# Patient Record
Sex: FEMALE | Race: WHITE | Employment: FULL TIME | URBAN - METROPOLITAN AREA
[De-identification: names, ages, dates, MRNs, and addresses within clinical notes are randomized per-mention and may not be internally consistent; named-entity substitution may affect disease eponyms.]

---

## 2017-10-27 ENCOUNTER — OFFICE VISIT (OUTPATIENT)
Dept: FAMILY MEDICINE CLINIC | Facility: CLINIC | Age: 42
End: 2017-10-27

## 2017-10-27 VITALS
HEIGHT: 63 IN | RESPIRATION RATE: 18 BRPM | TEMPERATURE: 98 F | SYSTOLIC BLOOD PRESSURE: 102 MMHG | WEIGHT: 122.13 LBS | HEART RATE: 61 BPM | BODY MASS INDEX: 21.64 KG/M2 | DIASTOLIC BLOOD PRESSURE: 64 MMHG

## 2017-10-27 DIAGNOSIS — Z12.39 ENCOUNTER FOR OTHER SCREENING FOR MALIGNANT NEOPLASM OF BREAST: ICD-10-CM

## 2017-10-27 DIAGNOSIS — Z00.00 ROUTINE GENERAL MEDICAL EXAMINATION AT A HEALTH CARE FACILITY: Primary | ICD-10-CM

## 2017-10-27 PROCEDURE — 99396 PREV VISIT EST AGE 40-64: CPT | Performed by: FAMILY MEDICINE

## 2017-10-27 RX ORDER — TIMOLOL MALEATE 5 MG/ML
0.5 SOLUTION/ DROPS OPHTHALMIC 2 TIMES DAILY
COMMUNITY
Start: 2017-09-15

## 2017-10-27 RX ORDER — DORZOLAMIDE HCL 20 MG/ML
0.5 SOLUTION/ DROPS OPHTHALMIC 2 TIMES DAILY
COMMUNITY
Start: 2017-09-15

## 2017-10-27 NOTE — PROGRESS NOTES
Loi Christianson is a 38year old female here for Patient presents with: Well Adult: Cpx only.     HPI:   Patient is see for her annual physical   Pap done last year was normal  Does do breast exam at home    PAST MEDICAL HISTORY:     Past Medical History Activity: Yes    Partners: Male     Other Topics Concern    Caffeine Concern No    Exercise Yes     Social History Narrative     REVIEW OF SYSTEMS:     Constitutional: no change in weight or appetite. No fever, fatigue or myalgias.   Eye: No change in visio atraumatic, normocephalic,external ears and TMs normal, nose patent, throat normal without inflammation. NECK: supple,no adenopathy, no thyromegaly. CHEST: no chest wall tenderness.   BREASTS: symmetrical bilateral, no dominant or suspicious mass, no nipp

## 2017-11-10 ENCOUNTER — HOSPITAL ENCOUNTER (OUTPATIENT)
Dept: MAMMOGRAPHY | Age: 42
Discharge: HOME | End: 2017-11-10
Attending: FAMILY MEDICINE
Payer: COMMERCIAL

## 2017-11-10 DIAGNOSIS — Z12.39 ENCOUNTER FOR OTHER SCREENING FOR MALIGNANT NEOPLASM OF BREAST: ICD-10-CM

## 2017-11-10 PROCEDURE — 77067 SCR MAMMO BI INCL CAD: CPT | Performed by: FAMILY MEDICINE

## 2017-11-22 ENCOUNTER — HOSPITAL ENCOUNTER (OUTPATIENT)
Dept: MAMMOGRAPHY | Age: 42
Discharge: HOME | End: 2017-11-22
Attending: FAMILY MEDICINE
Payer: COMMERCIAL

## 2017-11-22 ENCOUNTER — TELEPHONE (OUTPATIENT)
Dept: FAMILY MEDICINE CLINIC | Facility: CLINIC | Age: 42
End: 2017-11-22

## 2017-11-22 DIAGNOSIS — R92.2 INCONCLUSIVE MAMMOGRAM: ICD-10-CM

## 2017-11-22 PROCEDURE — 77065 DX MAMMO INCL CAD UNI: CPT | Performed by: FAMILY MEDICINE

## 2017-11-22 NOTE — TELEPHONE ENCOUNTER
Dr Av Matthews wants to do / stereotactic  breast biopsy for 1 site due to calcium deposits. Patient and spouse aware.

## 2017-11-29 ENCOUNTER — PATIENT MESSAGE (OUTPATIENT)
Dept: FAMILY MEDICINE CLINIC | Facility: CLINIC | Age: 42
End: 2017-11-29

## 2017-11-29 ENCOUNTER — HOSPITAL ENCOUNTER (OUTPATIENT)
Dept: MAMMOGRAPHY | Facility: HOSPITAL | Age: 42
Discharge: HOME | End: 2017-11-29
Attending: FAMILY MEDICINE
Payer: COMMERCIAL

## 2017-11-29 DIAGNOSIS — R92.1 BREAST CALCIFICATIONS: ICD-10-CM

## 2017-11-29 PROCEDURE — 88305 TISSUE EXAM BY PATHOLOGIST: CPT | Performed by: FAMILY MEDICINE

## 2017-11-29 PROCEDURE — 19081 BX BREAST 1ST LESION STRTCTC: CPT | Performed by: FAMILY MEDICINE

## 2017-11-29 NOTE — TELEPHONE ENCOUNTER
From: Milton Greco  To: Mariaa Corbett MD  Sent: 11/29/2017 3:51 PM CST  Subject: Other    Hello,    I haven't had my blood work done yet because I was getting the mammogram work done. I had to have a biopsy done today.     Would it be okay if I tr

## 2017-11-29 NOTE — PROCEDURES
PROCEDURE: [facility] BIOPSY STEREOTACTIC W/CALC 1 SITE LEFT    COMPARISON: [place], [facility] DIAGNOSTIC ADDL VWS LEFT (GVF=[id number]), 11/22/2017, 7:52.     INDICATIONS: R92.1 Mammographic calcification found on diagnostic imaging of breast    DESCRIPTION: Witnessed ve

## 2017-11-30 ENCOUNTER — TELEPHONE (OUTPATIENT)
Dept: MAMMOGRAPHY | Facility: HOSPITAL | Age: 42
End: 2017-11-30

## 2017-11-30 NOTE — TELEPHONE ENCOUNTER
From: Zulema Boothe  To: Vashti Fowler MD  Sent: 11/29/2017 9:21 PM CST  Subject: Other    Hello, can you please send me a list or link of the facilities I can have labs drawn?   Thanks, Joanna

## 2017-11-30 NOTE — TELEPHONE ENCOUNTER
Telephoned [facility] and name,  verified with pt. Notified Joanna Conway of benign left breast stereotactic biopsy result. Joanna Wells Bodily reports biopsy site is healing well. Hematoma management discussed.  Radiologist recommends next [facility]

## 2017-11-30 NOTE — TELEPHONE ENCOUNTER
You can log on to   [email or web address]    Follow the prompts for lab locations    Please be mikey you are using an [employer] for the [facility] discount (the [facility] labs by [facility] are not [facility] labs)

## 2018-09-26 ENCOUNTER — PATIENT MESSAGE (OUTPATIENT)
Dept: FAMILY MEDICINE CLINIC | Facility: CLINIC | Age: 43
End: 2018-09-26

## 2018-09-26 DIAGNOSIS — Z12.31 ENCOUNTER FOR SCREENING MAMMOGRAM FOR MALIGNANT NEOPLASM OF BREAST: ICD-10-CM

## 2018-09-26 DIAGNOSIS — R92.0 BREAST MICROCALCIFICATIONS: Primary | ICD-10-CM

## 2018-09-27 NOTE — TELEPHONE ENCOUNTER
From: Savana Holland  To: nAdre Hathaway MD  Sent: 9/26/2018 10:59 AM CDT  Subject: Non-Urgent Medical Question    Hello,  I'm inquiring to see if I was supposed to have a follow-up mammogram at 6 months or a year.  I've passed the 6months but want t

## 2018-09-27 NOTE — TELEPHONE ENCOUNTER
11/17  Radiologist recommends next mammogram is due in 6 months for follow up. Pt verbalized understanding and had no further questions at this time.        Mammogram pended.

## 2018-10-17 ENCOUNTER — HOSPITAL ENCOUNTER (OUTPATIENT)
Dept: MAMMOGRAPHY | Age: 43
Discharge: HOME | End: 2018-10-17
Attending: FAMILY MEDICINE
Payer: COMMERCIAL

## 2018-10-17 DIAGNOSIS — R92.0 BREAST MICROCALCIFICATIONS: ICD-10-CM

## 2018-10-17 PROCEDURE — 77062 BREAST TOMOSYNTHESIS BI: CPT | Performed by: FAMILY MEDICINE

## 2018-10-17 PROCEDURE — 77066 DX MAMMO INCL CAD BI: CPT | Performed by: FAMILY MEDICINE

## 2018-11-16 ENCOUNTER — OFFICE VISIT (OUTPATIENT)
Dept: FAMILY MEDICINE CLINIC | Facility: CLINIC | Age: 43
End: 2018-11-16
Payer: COMMERCIAL

## 2018-11-16 VITALS
BODY MASS INDEX: 21.82 KG/M2 | HEIGHT: 63 IN | TEMPERATURE: 98 F | HEART RATE: 64 BPM | OXYGEN SATURATION: 99 % | SYSTOLIC BLOOD PRESSURE: 112 MMHG | RESPIRATION RATE: 16 BRPM | DIASTOLIC BLOOD PRESSURE: 68 MMHG | WEIGHT: 123.13 LBS

## 2018-11-16 DIAGNOSIS — Z23 NEED FOR VACCINATION: ICD-10-CM

## 2018-11-16 DIAGNOSIS — Z00.00 ROUTINE GENERAL MEDICAL EXAMINATION AT A HEALTH CARE FACILITY: Primary | ICD-10-CM

## 2018-11-16 DIAGNOSIS — Z12.83 SCREENING FOR SKIN CANCER: ICD-10-CM

## 2018-11-16 PROCEDURE — 90471 IMMUNIZATION ADMIN: CPT | Performed by: FAMILY MEDICINE

## 2018-11-16 PROCEDURE — 99396 PREV VISIT EST AGE 40-64: CPT | Performed by: FAMILY MEDICINE

## 2018-11-16 PROCEDURE — 90472 IMMUNIZATION ADMIN EACH ADD: CPT | Performed by: FAMILY MEDICINE

## 2018-11-16 PROCEDURE — 90714 TD VACC NO PRESV 7 YRS+ IM: CPT | Performed by: FAMILY MEDICINE

## 2018-11-16 PROCEDURE — 90686 IIV4 VACC NO PRSV 0.5 ML IM: CPT | Performed by: FAMILY MEDICINE

## 2018-11-16 NOTE — PROGRESS NOTES
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 GENERAL: well developed, well nourished,in no apparent distress  SKIN: multiple benign appearing nevi on trunk arms and legs.  Small less than 1 mm scab like lesion in th

## 2018-11-16 NOTE — PATIENT INSTRUCTIONS
Prevention Guidelines, Women Ages 36 to 52  Screening tests and vaccines are an important part of managing your health. A screening test is done to find possible disorders or diseases in people who don't have any symptoms.  The goal is to find a disease e Depression All women in this age group At routine exams   Gonorrhea Sexually active women at increased risk for infection At routine exams   Hepatitis C Anyone at increased risk; 1 time for those born between [facility] At routine exams   High cholester Meningococcal Women at increased risk for infection–talk with your healthcare provider 1 or more doses   Pneumococcal conjugate vaccine (PCV13) and pneumococcal polysaccharide vaccine (PPSV23) Women at increased risk for infection–talk with your healthcare

## 2018-11-21 ENCOUNTER — LAB ENCOUNTER (OUTPATIENT)
Dept: LAB | Age: 43
End: 2018-11-21
Attending: FAMILY MEDICINE
Payer: COMMERCIAL

## 2018-11-21 DIAGNOSIS — Z00.00 ROUTINE GENERAL MEDICAL EXAMINATION AT A HEALTH CARE FACILITY: ICD-10-CM

## 2018-11-21 PROCEDURE — 36415 COLL VENOUS BLD VENIPUNCTURE: CPT | Performed by: FAMILY MEDICINE

## 2018-11-21 PROCEDURE — 80050 GENERAL HEALTH PANEL: CPT | Performed by: FAMILY MEDICINE

## 2018-11-21 PROCEDURE — 80061 LIPID PANEL: CPT | Performed by: FAMILY MEDICINE

## 2019-01-29 ENCOUNTER — OFFICE VISIT (OUTPATIENT)
Dept: FAMILY MEDICINE CLINIC | Facility: CLINIC | Age: 44
End: 2019-01-29
Payer: COMMERCIAL

## 2019-01-29 VITALS
SYSTOLIC BLOOD PRESSURE: 112 MMHG | RESPIRATION RATE: 16 BRPM | DIASTOLIC BLOOD PRESSURE: 58 MMHG | HEIGHT: 62.5 IN | BODY MASS INDEX: 22.09 KG/M2 | HEART RATE: 60 BPM | OXYGEN SATURATION: 98 % | WEIGHT: 123.13 LBS | TEMPERATURE: 98 F

## 2019-01-29 DIAGNOSIS — N89.8 VAGINAL LEUKORRHEA: ICD-10-CM

## 2019-01-29 DIAGNOSIS — R04.0 EPISTAXIS: ICD-10-CM

## 2019-01-29 DIAGNOSIS — N93.8 DUB (DYSFUNCTIONAL UTERINE BLEEDING): Primary | ICD-10-CM

## 2019-01-29 PROCEDURE — 87660 TRICHOMONAS VAGIN DIR PROBE: CPT | Performed by: FAMILY MEDICINE

## 2019-01-29 PROCEDURE — 87510 GARDNER VAG DNA DIR PROBE: CPT | Performed by: FAMILY MEDICINE

## 2019-01-29 PROCEDURE — 99000 SPECIMEN HANDLING OFFICE-LAB: CPT | Performed by: FAMILY MEDICINE

## 2019-01-29 PROCEDURE — 87480 CANDIDA DNA DIR PROBE: CPT | Performed by: FAMILY MEDICINE

## 2019-01-29 PROCEDURE — 99213 OFFICE O/P EST LOW 20 MIN: CPT | Performed by: FAMILY MEDICINE

## 2019-01-29 NOTE — PATIENT INSTRUCTIONS
Use P tracker devorah to monitor your cycle. If periods are getting more irregular and are heavier please follow up with me. Will call with vaginitis panel results when available.     Please monitor your nasal bleeding, please use nasal saline spray daily to

## 2019-01-29 NOTE — PROGRESS NOTES
Roseanne Nunes is a 45year old female. Patient presents with:  Menstrual Problem: Discuss periods being irregular and blot clots then normal.    HPI:   LMP 1/25/19 after having spotting for 2 weeks, ended yesterday.  Patient states spotting starting af use: No       REVIEW OF SYSTEMS:   GENERAL HEALTH: feels well otherwise  GYN: as per HPI    EXAM:   /58   Pulse 60   Temp 98.2 °F (36.8 °C) (Temporal)   Resp 16   Ht 62.5\"   Wt 123 lb 2 oz   LMP 01/25/2019 (Exact Date)   SpO2 98%   BMI 22.16 kg/m²

## 2019-08-12 ENCOUNTER — TELEPHONE (OUTPATIENT)
Dept: FAMILY MEDICINE CLINIC | Facility: CLINIC | Age: 44
End: 2019-08-12

## 2019-08-14 ENCOUNTER — NURSE ONLY (OUTPATIENT)
Dept: FAMILY MEDICINE CLINIC | Facility: CLINIC | Age: 44
End: 2019-08-14
Payer: COMMERCIAL

## 2019-08-14 LAB — INDURATION (): 0 MM (ref 0–11)

## 2019-08-14 PROCEDURE — 86580 TB INTRADERMAL TEST: CPT | Performed by: FAMILY MEDICINE

## 2019-08-16 ENCOUNTER — NURSE ONLY (OUTPATIENT)
Dept: FAMILY MEDICINE CLINIC | Facility: CLINIC | Age: 44
End: 2019-08-16
Payer: COMMERCIAL

## 2019-11-22 ENCOUNTER — OFFICE VISIT (OUTPATIENT)
Dept: FAMILY MEDICINE CLINIC | Facility: CLINIC | Age: 44
End: 2019-11-22
Payer: COMMERCIAL

## 2019-11-22 VITALS
HEIGHT: 63 IN | DIASTOLIC BLOOD PRESSURE: 72 MMHG | TEMPERATURE: 98 F | WEIGHT: 126 LBS | RESPIRATION RATE: 18 BRPM | SYSTOLIC BLOOD PRESSURE: 98 MMHG | OXYGEN SATURATION: 97 % | HEART RATE: 78 BPM | BODY MASS INDEX: 22.32 KG/M2

## 2019-11-22 DIAGNOSIS — Z12.39 SCREENING FOR BREAST CANCER: ICD-10-CM

## 2019-11-22 DIAGNOSIS — Z00.00 ROUTINE GENERAL MEDICAL EXAMINATION AT A HEALTH CARE FACILITY: Primary | ICD-10-CM

## 2019-11-22 DIAGNOSIS — Z23 NEED FOR VACCINATION: ICD-10-CM

## 2019-11-22 PROCEDURE — 90471 IMMUNIZATION ADMIN: CPT | Performed by: FAMILY MEDICINE

## 2019-11-22 PROCEDURE — 90686 IIV4 VACC NO PRSV 0.5 ML IM: CPT | Performed by: FAMILY MEDICINE

## 2019-11-22 PROCEDURE — 99396 PREV VISIT EST AGE 40-64: CPT | Performed by: FAMILY MEDICINE

## 2019-11-22 NOTE — PATIENT INSTRUCTIONS
Prevention Guidelines, Women Ages 36 to 52  Screening tests and vaccines are an important part of managing your health. A screening test is done to find diseases in people who don't have any symptoms.  The goal is to find a disease early so lifestyle alatorre · Flexible sigmoidoscopy every 5 years, or  · Colonoscopy every 10 years, or  · CT colonography (virtual colonoscopy) every 5 years, or  · Yearly fecal occult blood test, or  · Yearly fecal immunochemical test every year, or  · Stool DNA test, every 3 year Chickenpox (varicella) All women in this age group who have no record of this infection or vaccine 2 doses; the second dose should be given at least 4 weeks after the first dose   Hepatitis A Women at increased risk for infection–talk with your healthcare Use of tobacco and the health effects it can cause All women in this age group Every exam   1 American Diabetes Association  2 American College of Obstetricians and Gynecologists   [address]  [address] [facility]  Date Last R

## 2019-11-22 NOTE — PROGRESS NOTES
Art Alba is a 45year old female here for Patient presents with:  Physical    HPI:   Patient is see for her annual physical   Is due for her pap smear, will come back as she is on her period.   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 GENERAL: well developed, well nourished,in no apparent distress  SKIN: multiple benign appearing nevi on trunk arms and legs.    EYES:PERRLA

## 2019-11-27 ENCOUNTER — LABORATORY ENCOUNTER (OUTPATIENT)
Dept: LAB | Age: 44
End: 2019-11-27
Attending: FAMILY MEDICINE
Payer: COMMERCIAL

## 2019-11-27 ENCOUNTER — HOSPITAL ENCOUNTER (OUTPATIENT)
Dept: MAMMOGRAPHY | Age: 44
Discharge: HOME | End: 2019-11-27
Attending: FAMILY MEDICINE
Payer: COMMERCIAL

## 2019-11-27 DIAGNOSIS — Z12.39 SCREENING FOR BREAST CANCER: ICD-10-CM

## 2019-11-27 DIAGNOSIS — Z00.00 ROUTINE GENERAL MEDICAL EXAMINATION AT A HEALTH CARE FACILITY: ICD-10-CM

## 2019-11-27 PROCEDURE — 77063 BREAST TOMOSYNTHESIS BI: CPT | Performed by: FAMILY MEDICINE

## 2019-11-27 PROCEDURE — 36415 COLL VENOUS BLD VENIPUNCTURE: CPT | Performed by: FAMILY MEDICINE

## 2019-11-27 PROCEDURE — 80050 GENERAL HEALTH PANEL: CPT | Performed by: FAMILY MEDICINE

## 2019-11-27 PROCEDURE — 77067 SCR MAMMO BI INCL CAD: CPT | Performed by: FAMILY MEDICINE

## 2019-11-27 PROCEDURE — 80061 LIPID PANEL: CPT | Performed by: FAMILY MEDICINE

## 2019-12-09 ENCOUNTER — OFFICE VISIT (OUTPATIENT)
Dept: FAMILY MEDICINE CLINIC | Facility: CLINIC | Age: 44
End: 2019-12-09
Payer: COMMERCIAL

## 2019-12-09 VITALS
DIASTOLIC BLOOD PRESSURE: 78 MMHG | SYSTOLIC BLOOD PRESSURE: 98 MMHG | HEART RATE: 68 BPM | HEIGHT: 63 IN | OXYGEN SATURATION: 98 % | WEIGHT: 127 LBS | RESPIRATION RATE: 18 BRPM | TEMPERATURE: 98 F | BODY MASS INDEX: 22.5 KG/M2

## 2019-12-09 DIAGNOSIS — Z01.419 ENCOUNTER FOR ROUTINE GYNECOLOGICAL EXAMINATION WITH PAPANICOLAOU SMEAR OF CERVIX: Primary | ICD-10-CM

## 2019-12-09 PROCEDURE — 87624 HPV HI-RISK TYP POOLED RSLT: CPT | Performed by: FAMILY MEDICINE

## 2019-12-09 PROCEDURE — 88175 CYTOPATH C/V AUTO FLUID REDO: CPT | Performed by: FAMILY MEDICINE

## 2019-12-09 NOTE — PROGRESS NOTES
Nelly Figueroa is a 45year old female. Patient presents with:  Pap: only    HPI:   Patient is seen for well woman GYN exam.  Already had her physical done.     ALLERGY:     S[facility]*        Comment:\"Sulfa\"  Sulfa Antibiotics       RASH auscultation  CARDIO: RRR without murmur  GYN: Normal external genitalia, normal pink vaginal mucosa, small amount of whitish discharge, cervix appears normal, Pap collected, bimanual no adnexal mass or tenderness, uterus anteverted and nontender.     ASSES

## 2020-02-18 ENCOUNTER — PATIENT MESSAGE (OUTPATIENT)
Dept: FAMILY MEDICINE CLINIC | Facility: CLINIC | Age: 45
End: 2020-02-18

## 2020-02-19 ENCOUNTER — PATIENT MESSAGE (OUTPATIENT)
Dept: FAMILY MEDICINE CLINIC | Facility: CLINIC | Age: 45
End: 2020-02-19

## 2020-02-19 RX ORDER — OSELTAMIVIR PHOSPHATE 75 MG/1
75 CAPSULE ORAL 2 TIMES DAILY
Qty: 10 CAPSULE | Refills: 0 | Status: SHIPPED | OUTPATIENT
Start: 2020-02-19 | End: 2020-02-24

## 2020-02-19 NOTE — TELEPHONE ENCOUNTER
From: Belem Bullock  To: Aris Greco MD  Sent: 2/18/2020 7:34 PM CST  Subject: Prescription Question    Hello, I'm traveling to [place], [place] on Saturday. I'm worried about getting the flu while down there.  I've had the shot but I know so many peo

## 2020-02-19 NOTE — TELEPHONE ENCOUNTER
From: Andra Nurse  To: Alise Vargas MD  Sent: 2/19/2020 1:36 PM CST  Subject: Prescription Question    Yes, if you don't mind, can you please send in a prescription for me? Thank you very much. If I don't get (fingers crossed! !!!) is there

## 2020-02-21 ENCOUNTER — PATIENT MESSAGE (OUTPATIENT)
Dept: FAMILY MEDICINE CLINIC | Facility: CLINIC | Age: 45
End: 2020-02-21

## 2020-02-24 NOTE — TELEPHONE ENCOUNTER
From: Beverley Delarosa  To: Regina Hernández MD  Sent: 2/21/2020 8:18 PM CST  Subject: Prescription Question    I never got notified the prescription was sent   We have left and I didn’t get it so please disregard and don’t call it in at this point  Than

## 2020-04-27 ENCOUNTER — PATIENT MESSAGE (OUTPATIENT)
Dept: FAMILY MEDICINE CLINIC | Facility: CLINIC | Age: 45
End: 2020-04-27

## 2020-04-28 NOTE — TELEPHONE ENCOUNTER
From: Jayorise Nurse  To: Alise Vargas MD  Sent: 4/27/2020 4:43 PM CDT  Subject: Non-Urgent Medical Question    Hello,   I hope everyone is doing well.   With new data showing complications with blood clotting and knowing I have a history with it I

## 2020-10-30 ENCOUNTER — TELEPHONE (OUTPATIENT)
Dept: FAMILY MEDICINE CLINIC | Facility: CLINIC | Age: 45
End: 2020-10-30

## 2020-10-30 DIAGNOSIS — Z20.822 COUGH WITH EXPOSURE TO COVID-19 VIRUS: Primary | ICD-10-CM

## 2020-10-30 DIAGNOSIS — R05.8 COUGH WITH EXPOSURE TO COVID-19 VIRUS: Primary | ICD-10-CM

## 2020-10-30 NOTE — TELEPHONE ENCOUNTER
Pt's daughter was diagnosed with covid today, she said the pt's pediatrician recc testing for whole family, pt wants order for covid test said she has some slight cold symptoms

## 2020-10-30 NOTE — TELEPHONE ENCOUNTER
Called patient who states her 12 yo daughter had symptoms starting Tuesday. She was tested and is Covid positive. Daughter is quarantining \"most of the time\". Patient has slight runny nose that has been going on for awhile and has a little dry cough.

## 2020-11-02 ENCOUNTER — TELEMEDICINE (OUTPATIENT)
Dept: FAMILY MEDICINE CLINIC | Facility: CLINIC | Age: 45
End: 2020-11-02

## 2020-11-02 ENCOUNTER — APPOINTMENT (OUTPATIENT)
Dept: LAB | Age: 45
End: 2020-11-02
Attending: FAMILY MEDICINE
Payer: COMMERCIAL

## 2020-11-02 DIAGNOSIS — Z20.822 COUGH WITH EXPOSURE TO COVID-19 VIRUS: ICD-10-CM

## 2020-11-02 DIAGNOSIS — B34.9 VIRAL ILLNESS: Primary | ICD-10-CM

## 2020-11-02 DIAGNOSIS — R05.8 COUGH WITH EXPOSURE TO COVID-19 VIRUS: ICD-10-CM

## 2020-11-02 DIAGNOSIS — Z20.822 CLOSE EXPOSURE TO COVID-19 VIRUS: ICD-10-CM

## 2020-11-02 PROCEDURE — 99213 OFFICE O/P EST LOW 20 MIN: CPT | Performed by: FAMILY MEDICINE

## 2020-11-02 NOTE — PROGRESS NOTES
Nelly Figueroa is a 39year old female. Patient presents with:  Covid:  no symptoms    This visit is conducted using telemedicine with live interactive video and audio. Nelly Figueroa verbally consents to virtual video visit.    Patient understands a Tobacco Use      Smoking status: Never Smoker      Smokeless tobacco: Never Used    Alcohol use: Yes      Comment: rare    Drug use: No       REVIEW OF SYSTEMS:   GENERAL HEALTH: feels well otherwise  SKIN: denies any unusual skin lesions or rashes  RESPIR

## 2020-11-05 ENCOUNTER — PATIENT MESSAGE (OUTPATIENT)
Dept: FAMILY MEDICINE CLINIC | Facility: CLINIC | Age: 45
End: 2020-11-05

## 2020-11-06 NOTE — TELEPHONE ENCOUNTER
Viewed by Naseem Marc on 11/5/2020  7:56 PM  Written by Prem Verduzco MD on 11/5/2020  7:56 PM  Negative covid test, I would still recommend 14 days quarantine as sometimes the test can be false negative.

## 2020-11-06 NOTE — TELEPHONE ENCOUNTER
From: Evangelist Denton  To: Daryle Powers, MD  Sent: 11/5/2020 7:59 PM CST  Subject: Test Results Question    I have a question about SARS-CoV-2 BY PCR (COVID-19) resulted on 11/5/20, 1:45 PM.    Thanks!   We are still Elvin Rouse was also pos

## 2020-11-17 ENCOUNTER — TELEPHONE (OUTPATIENT)
Dept: FAMILY MEDICINE CLINIC | Facility: CLINIC | Age: 45
End: 2020-11-17

## 2020-11-17 DIAGNOSIS — Z12.31 ENCOUNTER FOR SCREENING MAMMOGRAM FOR BREAST CANCER: Primary | ICD-10-CM

## 2020-11-17 NOTE — TELEPHONE ENCOUNTER
Pt called back to reschedule the 11-23-20 physical.  (changed due to Dr's schedule being virtual)    Joni moved to 12/2/20. Pt asked if she can get the orders for the mammogram so she can get them done prior to the 12/2/20 joni.

## 2020-11-28 ENCOUNTER — HOSPITAL ENCOUNTER (OUTPATIENT)
Dept: MAMMOGRAPHY | Age: 45
Discharge: HOME | End: 2020-11-28
Attending: FAMILY MEDICINE
Payer: COMMERCIAL

## 2020-11-28 DIAGNOSIS — Z12.31 ENCOUNTER FOR SCREENING MAMMOGRAM FOR BREAST CANCER: ICD-10-CM

## 2020-11-28 PROCEDURE — 77067 SCR MAMMO BI INCL CAD: CPT | Performed by: FAMILY MEDICINE

## 2020-11-28 PROCEDURE — 77063 BREAST TOMOSYNTHESIS BI: CPT | Performed by: FAMILY MEDICINE

## 2020-12-02 ENCOUNTER — HOSPITAL ENCOUNTER (OUTPATIENT)
Dept: MAMMOGRAPHY | Facility: HOSPITAL | Age: 45
Discharge: HOME | End: 2020-12-02
Attending: FAMILY MEDICINE
Payer: COMMERCIAL

## 2020-12-02 ENCOUNTER — OFFICE VISIT (OUTPATIENT)
Dept: FAMILY MEDICINE CLINIC | Facility: CLINIC | Age: 45
End: 2020-12-02
Payer: COMMERCIAL

## 2020-12-02 VITALS
RESPIRATION RATE: 18 BRPM | TEMPERATURE: 98 F | DIASTOLIC BLOOD PRESSURE: 72 MMHG | BODY MASS INDEX: 22.32 KG/M2 | OXYGEN SATURATION: 98 % | SYSTOLIC BLOOD PRESSURE: 100 MMHG | HEIGHT: 63 IN | WEIGHT: 126 LBS | HEART RATE: 68 BPM

## 2020-12-02 DIAGNOSIS — R92.2 INCONCLUSIVE MAMMOGRAM: ICD-10-CM

## 2020-12-02 DIAGNOSIS — Z00.00 ROUTINE GENERAL MEDICAL EXAMINATION AT A HEALTH CARE FACILITY: Primary | ICD-10-CM

## 2020-12-02 DIAGNOSIS — Z23 NEED FOR VACCINATION: ICD-10-CM

## 2020-12-02 DIAGNOSIS — N92.6 IRREGULAR MENSTRUAL CYCLE: ICD-10-CM

## 2020-12-02 PROCEDURE — 77061 BREAST TOMOSYNTHESIS UNI: CPT | Performed by: FAMILY MEDICINE

## 2020-12-02 PROCEDURE — 3008F BODY MASS INDEX DOCD: CPT | Performed by: FAMILY MEDICINE

## 2020-12-02 PROCEDURE — 3074F SYST BP LT 130 MM HG: CPT | Performed by: FAMILY MEDICINE

## 2020-12-02 PROCEDURE — 77065 DX MAMMO INCL CAD UNI: CPT | Performed by: FAMILY MEDICINE

## 2020-12-02 PROCEDURE — 76642 ULTRASOUND BREAST LIMITED: CPT | Performed by: FAMILY MEDICINE

## 2020-12-02 PROCEDURE — 90471 IMMUNIZATION ADMIN: CPT | Performed by: FAMILY MEDICINE

## 2020-12-02 PROCEDURE — 99396 PREV VISIT EST AGE 40-64: CPT | Performed by: FAMILY MEDICINE

## 2020-12-02 PROCEDURE — 3078F DIAST BP <80 MM HG: CPT | Performed by: FAMILY MEDICINE

## 2020-12-02 PROCEDURE — 90686 IIV4 VACC NO PRSV 0.5 ML IM: CPT | Performed by: FAMILY MEDICINE

## 2020-12-02 NOTE — PROGRESS NOTES
Chapito Calvo is a 39year old female.   Patient presents with:  Physical    HPI:   Chapito Calvo is a 39year old female seen for her annual physical.  Pap done last year was normal.    Patient complaining that her period was abnormal last month an hyperlipidemia   • Heart Disease Maternal Grandmother         CAD   • Breast Cancer Maternal Grandmother [de-identified]   • Cancer Paternal Uncle         colon ca, lung ca   • High Cholesterol Other         mother's family       SOCIAL HISTORY:   Social History    Soc kg)   LMP 11/26/2020   SpO2 98%   BMI 22.32 kg/m²     Physical Exam    Constitutional: She is oriented to person, place, and time. She appears well-developed and well-nourished. No distress. HENT:   Head: Normocephalic and atraumatic.    Right Ear: Tympan advised to continue to monitor her period and if persistently irregular to follow up.

## 2020-12-02 NOTE — PATIENT INSTRUCTIONS
Prevention Guidelines, Women Ages 36 to 52  Screening tests and vaccines are an important part of managing your health. A screening test is done to find diseases in people who don't have any symptoms.  The goal is to find a disease early so lifestyle alatorre · Flexible sigmoidoscopy every 5 years, or  · Colonoscopy every 10 years, or  · CT colonography (virtual colonoscopy) every 5 years, or  · Yearly fecal occult blood test, or  · Yearly fecal immunochemical test every year, or  · Stool DNA test, every 3 year Chickenpox (varicella) All women in this age group who have no record of this infection or vaccine 2 doses; the second dose should be given at least 4 weeks after the first dose   Hepatitis A Women at increased risk for infection–talk with your healthcare Use of tobacco and the health effects it can cause All women in this age group Every exam   1 American Diabetes Association  2 American College of Obstetricians and Gynecologists   [address]  [address] [facility]  [place]

## 2020-12-03 ENCOUNTER — LAB ENCOUNTER (OUTPATIENT)
Dept: LAB | Facility: HOSPITAL | Age: 45
End: 2020-12-03
Attending: FAMILY MEDICINE
Payer: COMMERCIAL

## 2020-12-03 DIAGNOSIS — Z00.00 ROUTINE GENERAL MEDICAL EXAMINATION AT A HEALTH CARE FACILITY: ICD-10-CM

## 2020-12-03 PROCEDURE — 84443 ASSAY THYROID STIM HORMONE: CPT

## 2020-12-03 PROCEDURE — 80053 COMPREHEN METABOLIC PANEL: CPT

## 2020-12-03 PROCEDURE — 80061 LIPID PANEL: CPT

## 2020-12-03 PROCEDURE — 36415 COLL VENOUS BLD VENIPUNCTURE: CPT

## 2020-12-03 PROCEDURE — 85025 COMPLETE CBC W/AUTO DIFF WBC: CPT

## 2020-12-12 ENCOUNTER — MED REC SCAN ONLY (OUTPATIENT)
Dept: FAMILY MEDICINE CLINIC | Facility: CLINIC | Age: 45
End: 2020-12-12

## 2021-02-09 ENCOUNTER — PATIENT MESSAGE (OUTPATIENT)
Dept: FAMILY MEDICINE CLINIC | Facility: CLINIC | Age: 46
End: 2021-02-09

## 2021-02-10 NOTE — TELEPHONE ENCOUNTER
From: Andra Nurse  To: Alise Vargas MD  Sent: 2/9/2021 10:07 AM CST  Subject: Non-Urgent Medical Question    Hello,  I work for a [employer] and we are going to be moving to in-person services beginning February 28th.  Does this put me on a 1A or 1B

## 2021-06-11 ENCOUNTER — PATIENT MESSAGE (OUTPATIENT)
Dept: FAMILY MEDICINE CLINIC | Facility: CLINIC | Age: 46
End: 2021-06-11

## 2021-06-11 DIAGNOSIS — D64.9 ANEMIA, UNSPECIFIED TYPE: Primary | ICD-10-CM

## 2021-06-11 NOTE — TELEPHONE ENCOUNTER
From: Gail Nava  To: Jaime Martinez MD  Sent: 6/11/2021 11:57 AM CDT  Subject: Non-Urgent Medical Question    Good Morning,  I attempted to donate blood 2x this week and was denied both times because my iron levels were low.   Tuesday's attempt:

## 2021-12-04 ENCOUNTER — HOSPITAL ENCOUNTER (OUTPATIENT)
Dept: MAMMOGRAPHY | Age: 46
Discharge: HOME | End: 2021-12-04
Attending: FAMILY MEDICINE
Payer: COMMERCIAL

## 2021-12-04 DIAGNOSIS — Z12.31 ENCOUNTER FOR SCREENING MAMMOGRAM FOR BREAST CANCER: ICD-10-CM

## 2021-12-04 PROCEDURE — 77063 BREAST TOMOSYNTHESIS BI: CPT | Performed by: FAMILY MEDICINE

## 2021-12-04 PROCEDURE — 77067 SCR MAMMO BI INCL CAD: CPT | Performed by: FAMILY MEDICINE

## 2022-01-03 ENCOUNTER — OFFICE VISIT (OUTPATIENT)
Dept: FAMILY MEDICINE CLINIC | Facility: CLINIC | Age: 47
End: 2022-01-03
Payer: COMMERCIAL

## 2022-01-03 VITALS
SYSTOLIC BLOOD PRESSURE: 118 MMHG | OXYGEN SATURATION: 98 % | TEMPERATURE: 97 F | HEIGHT: 63 IN | RESPIRATION RATE: 18 BRPM | HEART RATE: 92 BPM | BODY MASS INDEX: 23.39 KG/M2 | DIASTOLIC BLOOD PRESSURE: 76 MMHG | WEIGHT: 132 LBS

## 2022-01-03 DIAGNOSIS — D64.9 ANEMIA, UNSPECIFIED TYPE: ICD-10-CM

## 2022-01-03 DIAGNOSIS — Z12.11 SCREENING FOR COLON CANCER: ICD-10-CM

## 2022-01-03 DIAGNOSIS — Z00.00 ROUTINE GENERAL MEDICAL EXAMINATION AT A HEALTH CARE FACILITY: Primary | ICD-10-CM

## 2022-01-03 PROCEDURE — 3078F DIAST BP <80 MM HG: CPT | Performed by: FAMILY MEDICINE

## 2022-01-03 PROCEDURE — 3074F SYST BP LT 130 MM HG: CPT | Performed by: FAMILY MEDICINE

## 2022-01-03 PROCEDURE — 3008F BODY MASS INDEX DOCD: CPT | Performed by: FAMILY MEDICINE

## 2022-01-03 PROCEDURE — 99396 PREV VISIT EST AGE 40-64: CPT | Performed by: FAMILY MEDICINE

## 2022-01-03 NOTE — PROGRESS NOTES
Peggy Jackson is a 41year old female. Patient presents with:  Physical    HPI:   Peggy Jackson is a 41year old female.   Pap done in 2019 was normal  Already did her mammogram in November, dense breast tissue otherwise normal    Followed up with Uncle         colon ca, lung ca   • High Cholesterol Other         mother's family       SOCIAL HISTORY:   Social History    Socioeconomic History      Marital status:       Spouse name: Not on file      Number of children: 2      Occupational Histo Appearance: Normal appearance. She is well-developed and normal weight. HENT:      Head: Normocephalic and atraumatic.       Right Ear: Tympanic membrane and external ear normal.      Left Ear: Tympanic membrane and external ear normal.      Nose: Nose no LIPID PANEL; Future  -     ASSAY, THYROID STIM HORMONE; Future  -     COMP METABOLIC PANEL (14); Future    Anemia, unspecified type  -     IRON AND TIBC;  Future  -     FERRITIN; Future    Screening for colon cancer  -     GASTRO - INTERNAL  -     OCCULT

## 2022-01-03 NOTE — PATIENT INSTRUCTIONS
Prevention Guidelines, Women Ages 36 to 52  Screening tests and vaccines are an important part of managing your health. A screening test is done to find diseases in people who don't have any symptoms.  The goal is to find a disease early so lifestyle alatorre sigmoidoscopy every 5 years, or  · Colonoscopy every 10 years, or  · CT colonography (virtual colonoscopy) every 5 years, or  · Yearly fecal occult blood test, or  · Yearly fecal immunochemical test every year, or  · Stool DNA test, every 3 years  If you c least 4 weeks after the first dose   Hepatitis A Women at increased risk for infection–talk with your healthcare provider 2 doses given 6 months apart   Hepatitis B Women at increased risk for infection–talk with your healthcare provider 3 doses over 6 mon American Academy of Ophthalmology  Keith last reviewed this educational content on 11/1/2017  © 4029-8757 [employer]. All rights reserved. This information is not intended as a substitute for professional medical care.  Always follow your

## 2022-01-10 ENCOUNTER — LAB ENCOUNTER (OUTPATIENT)
Dept: LAB | Age: 47
End: 2022-01-10
Attending: FAMILY MEDICINE
Payer: COMMERCIAL

## 2022-01-10 DIAGNOSIS — D64.9 ANEMIA, UNSPECIFIED TYPE: ICD-10-CM

## 2022-01-10 DIAGNOSIS — Z00.00 ROUTINE GENERAL MEDICAL EXAMINATION AT A HEALTH CARE FACILITY: ICD-10-CM

## 2022-01-10 LAB
ALBUMIN SERPL-MCNC: 3.5 G/DL (ref 3.4–5)
ALBUMIN/GLOB SERPL: 1.1 {RATIO} (ref 1–2)
ALP LIVER SERPL-CCNC: 66 U/L
ALT SERPL-CCNC: 25 U/L
ANION GAP SERPL CALC-SCNC: 2 MMOL/L (ref 0–18)
AST SERPL-CCNC: 25 U/L (ref 15–37)
BASOPHILS # BLD AUTO: 0.05 X10(3) UL (ref 0–0.2)
BASOPHILS NFR BLD AUTO: 0.9 %
BILIRUB SERPL-MCNC: 0.4 MG/DL (ref 0.1–2)
BUN BLD-MCNC: 13 MG/DL (ref 7–18)
CALCIUM BLD-MCNC: 8.6 MG/DL (ref 8.5–10.1)
CHLORIDE SERPL-SCNC: 110 MMOL/L (ref 98–112)
CHOLEST SERPL-MCNC: 148 MG/DL (ref ?–200)
CO2 SERPL-SCNC: 27 MMOL/L (ref 21–32)
CREAT BLD-MCNC: 0.65 MG/DL
DEPRECATED HBV CORE AB SER IA-ACNC: 16.8 NG/ML
EOSINOPHIL # BLD AUTO: 0.06 X10(3) UL (ref 0–0.7)
EOSINOPHIL NFR BLD AUTO: 1.1 %
ERYTHROCYTE [DISTWIDTH] IN BLOOD BY AUTOMATED COUNT: 14.6 %
FASTING PATIENT LIPID ANSWER: YES
FASTING STATUS PATIENT QL REPORTED: YES
GLOBULIN PLAS-MCNC: 3.2 G/DL (ref 2.8–4.4)
GLUCOSE BLD-MCNC: 86 MG/DL (ref 70–99)
HCT VFR BLD AUTO: 37.3 %
HDLC SERPL-MCNC: 90 MG/DL (ref 40–59)
HGB BLD-MCNC: 11.7 G/DL
IMM GRANULOCYTES # BLD AUTO: 0.01 X10(3) UL (ref 0–1)
IMM GRANULOCYTES NFR BLD: 0.2 %
IRON SATN MFR SERPL: 23 %
IRON SERPL-MCNC: 89 UG/DL
LDLC SERPL CALC-MCNC: 50 MG/DL (ref ?–100)
LYMPHOCYTES # BLD AUTO: 1.49 X10(3) UL (ref 1–4)
LYMPHOCYTES NFR BLD AUTO: 28 %
MCH RBC QN AUTO: 29.8 PG (ref 26–34)
MCHC RBC AUTO-ENTMCNC: 31.4 G/DL (ref 31–37)
MCV RBC AUTO: 95.2 FL
MONOCYTES # BLD AUTO: 0.4 X10(3) UL (ref 0.1–1)
MONOCYTES NFR BLD AUTO: 7.5 %
NEUTROPHILS # BLD AUTO: 3.31 X10 (3) UL (ref 1.5–7.7)
NEUTROPHILS # BLD AUTO: 3.31 X10(3) UL (ref 1.5–7.7)
NEUTROPHILS NFR BLD AUTO: 62.3 %
NONHDLC SERPL-MCNC: 58 MG/DL (ref ?–130)
OSMOLALITY SERPL CALC.SUM OF ELEC: 287 MOSM/KG (ref 275–295)
PLATELET # BLD AUTO: 206 10(3)UL (ref 150–450)
POTASSIUM SERPL-SCNC: 4.1 MMOL/L (ref 3.5–5.1)
PROT SERPL-MCNC: 6.7 G/DL (ref 6.4–8.2)
RBC # BLD AUTO: 3.92 X10(6)UL
SODIUM SERPL-SCNC: 139 MMOL/L (ref 136–145)
TIBC SERPL-MCNC: 390 UG/DL (ref 240–450)
TRANSFERRIN SERPL-MCNC: 262 MG/DL (ref 200–360)
TRIGL SERPL-MCNC: 30 MG/DL (ref 30–149)
TSI SER-ACNC: 0.99 MIU/ML (ref 0.36–3.74)
VLDLC SERPL CALC-MCNC: 4 MG/DL (ref 0–30)
WBC # BLD AUTO: 5.3 X10(3) UL (ref 4–11)

## 2022-01-10 PROCEDURE — 80050 GENERAL HEALTH PANEL: CPT | Performed by: FAMILY MEDICINE

## 2022-01-10 PROCEDURE — 82728 ASSAY OF FERRITIN: CPT | Performed by: FAMILY MEDICINE

## 2022-01-10 PROCEDURE — 83540 ASSAY OF IRON: CPT | Performed by: FAMILY MEDICINE

## 2022-01-10 PROCEDURE — 83550 IRON BINDING TEST: CPT | Performed by: FAMILY MEDICINE

## 2022-01-10 PROCEDURE — 80061 LIPID PANEL: CPT | Performed by: FAMILY MEDICINE

## 2022-11-04 ENCOUNTER — PATIENT MESSAGE (OUTPATIENT)
Dept: FAMILY MEDICINE CLINIC | Facility: CLINIC | Age: 47
End: 2022-11-04

## 2022-11-04 DIAGNOSIS — Z12.31 ENCOUNTER FOR SCREENING MAMMOGRAM FOR BREAST CANCER: Primary | ICD-10-CM

## 2022-11-04 NOTE — TELEPHONE ENCOUNTER
From: Kimberly Acosta  To: Deon Henry MD  Sent: 11/4/2022 11:02 AM CDT  Subject: Mammogram     Hello  I need a referral for my annual mammogram.   Thanks!

## 2022-12-06 ENCOUNTER — HOSPITAL ENCOUNTER (OUTPATIENT)
Dept: MAMMOGRAPHY | Age: 47
Discharge: HOME | End: 2022-12-06
Attending: FAMILY MEDICINE
Payer: COMMERCIAL

## 2022-12-06 DIAGNOSIS — Z12.31 ENCOUNTER FOR SCREENING MAMMOGRAM FOR BREAST CANCER: ICD-10-CM

## 2022-12-06 PROCEDURE — 77063 BREAST TOMOSYNTHESIS BI: CPT | Performed by: FAMILY MEDICINE

## 2022-12-06 PROCEDURE — 77067 SCR MAMMO BI INCL CAD: CPT | Performed by: FAMILY MEDICINE

## 2023-01-10 ENCOUNTER — OFFICE VISIT (OUTPATIENT)
Dept: FAMILY MEDICINE CLINIC | Facility: CLINIC | Age: 48
End: 2023-01-10
Payer: COMMERCIAL

## 2023-01-10 VITALS
HEIGHT: 63 IN | DIASTOLIC BLOOD PRESSURE: 68 MMHG | BODY MASS INDEX: 24.63 KG/M2 | RESPIRATION RATE: 18 BRPM | SYSTOLIC BLOOD PRESSURE: 110 MMHG | WEIGHT: 139 LBS | OXYGEN SATURATION: 98 % | TEMPERATURE: 98 F | HEART RATE: 69 BPM

## 2023-01-10 DIAGNOSIS — Z12.31 ENCOUNTER FOR SCREENING MAMMOGRAM FOR MALIGNANT NEOPLASM OF BREAST: ICD-10-CM

## 2023-01-10 DIAGNOSIS — Z01.419 ENCOUNTER FOR ROUTINE GYNECOLOGICAL EXAMINATION WITH PAPANICOLAOU SMEAR OF CERVIX: ICD-10-CM

## 2023-01-10 DIAGNOSIS — Z00.00 ROUTINE GENERAL MEDICAL EXAMINATION AT A HEALTH CARE FACILITY: Primary | ICD-10-CM

## 2023-01-10 DIAGNOSIS — Z12.11 SCREENING FOR COLON CANCER: ICD-10-CM

## 2023-01-10 PROCEDURE — 3078F DIAST BP <80 MM HG: CPT | Performed by: FAMILY MEDICINE

## 2023-01-10 PROCEDURE — 3008F BODY MASS INDEX DOCD: CPT | Performed by: FAMILY MEDICINE

## 2023-01-10 PROCEDURE — 99396 PREV VISIT EST AGE 40-64: CPT | Performed by: FAMILY MEDICINE

## 2023-01-10 PROCEDURE — 3074F SYST BP LT 130 MM HG: CPT | Performed by: FAMILY MEDICINE

## 2023-01-10 PROCEDURE — 87624 HPV HI-RISK TYP POOLED RSLT: CPT | Performed by: FAMILY MEDICINE

## 2023-01-11 LAB — HPV I/H RISK 1 DNA SPEC QL NAA+PROBE: NEGATIVE

## 2023-01-17 LAB
LAST PAP RESULT: NORMAL
PAP HISTORY (OTHER THAN LAST PAP): NORMAL

## 2023-09-20 ENCOUNTER — PATIENT MESSAGE (OUTPATIENT)
Dept: FAMILY MEDICINE CLINIC | Facility: CLINIC | Age: 48
End: 2023-09-20

## 2023-09-20 NOTE — TELEPHONE ENCOUNTER
From: Bernida Nyhan  To: Lisa Crump  Sent: 9/20/2023 1:57 PM CDT  Subject: Vaginal irritation    Hello, not sure if I should be seen or if an e-visit works  Having some vaginal irritation/order. Wondering if it's bacterial in nature. No discharge. Thank you!

## 2023-09-20 NOTE — TELEPHONE ENCOUNTER
Front- system will not let me override Friday slot @1:40pm with PCP. Please schedule and notify pt. Thank you.

## 2023-09-20 NOTE — TELEPHONE ENCOUNTER
Patient complaining of vaginal irritation/odor. No discharge. Advised that patient should be seen in office for evaluation. First available appt not until 9/26. Would you like to work in or can she see another provider? Thank you.

## 2023-09-20 NOTE — TELEPHONE ENCOUNTER
Pt r/c to schedule appt please advise where to schedule? 1st appt is on 9/26, ok to schedule with a different provider?

## 2023-09-21 ENCOUNTER — OFFICE VISIT (OUTPATIENT)
Dept: FAMILY MEDICINE CLINIC | Facility: CLINIC | Age: 48
End: 2023-09-21
Payer: COMMERCIAL

## 2023-09-21 VITALS
OXYGEN SATURATION: 98 % | SYSTOLIC BLOOD PRESSURE: 110 MMHG | WEIGHT: 134 LBS | HEIGHT: 62 IN | RESPIRATION RATE: 16 BRPM | TEMPERATURE: 99 F | DIASTOLIC BLOOD PRESSURE: 76 MMHG | HEART RATE: 68 BPM | BODY MASS INDEX: 24.66 KG/M2

## 2023-09-21 DIAGNOSIS — N30.91 CYSTITIS WITH HEMATURIA: ICD-10-CM

## 2023-09-21 DIAGNOSIS — R39.9 UTI SYMPTOMS: Primary | ICD-10-CM

## 2023-09-21 LAB
BILIRUBIN: NEGATIVE
GLUCOSE (URINE DIPSTICK): NEGATIVE MG/DL
KETONES (URINE DIPSTICK): NEGATIVE MG/DL
MULTISTIX LOT#: ABNORMAL NUMERIC
NITRITE, URINE: NEGATIVE
PH, URINE: 7 (ref 4.5–8)
PROTEIN (URINE DIPSTICK): NEGATIVE MG/DL
SPECIFIC GRAVITY: 1.01 (ref 1–1.03)
UROBILINOGEN,SEMI-QN: 0.2 MG/DL (ref 0–1.9)

## 2023-09-21 PROCEDURE — 87086 URINE CULTURE/COLONY COUNT: CPT | Performed by: NURSE PRACTITIONER

## 2023-09-21 PROCEDURE — 87186 SC STD MICRODIL/AGAR DIL: CPT | Performed by: NURSE PRACTITIONER

## 2023-09-21 PROCEDURE — 87077 CULTURE AEROBIC IDENTIFY: CPT | Performed by: NURSE PRACTITIONER

## 2023-09-21 PROCEDURE — 3074F SYST BP LT 130 MM HG: CPT | Performed by: NURSE PRACTITIONER

## 2023-09-21 PROCEDURE — 81003 URINALYSIS AUTO W/O SCOPE: CPT | Performed by: NURSE PRACTITIONER

## 2023-09-21 PROCEDURE — 3008F BODY MASS INDEX DOCD: CPT | Performed by: NURSE PRACTITIONER

## 2023-09-21 PROCEDURE — 99213 OFFICE O/P EST LOW 20 MIN: CPT | Performed by: NURSE PRACTITIONER

## 2023-09-21 PROCEDURE — 3078F DIAST BP <80 MM HG: CPT | Performed by: NURSE PRACTITIONER

## 2023-09-21 RX ORDER — NITROFURANTOIN 25; 75 MG/1; MG/1
100 CAPSULE ORAL 2 TIMES DAILY
Qty: 10 CAPSULE | Refills: 0 | Status: SHIPPED | OUTPATIENT
Start: 2023-09-21 | End: 2023-09-26

## 2023-12-08 ENCOUNTER — HOSPITAL ENCOUNTER (OUTPATIENT)
Dept: MAMMOGRAPHY | Age: 48
Discharge: HOME | End: 2023-12-08
Attending: FAMILY MEDICINE
Payer: COMMERCIAL

## 2023-12-08 DIAGNOSIS — Z12.31 ENCOUNTER FOR SCREENING MAMMOGRAM FOR MALIGNANT NEOPLASM OF BREAST: ICD-10-CM

## 2023-12-08 PROCEDURE — 77067 SCR MAMMO BI INCL CAD: CPT | Performed by: FAMILY MEDICINE

## 2023-12-08 PROCEDURE — 77063 BREAST TOMOSYNTHESIS BI: CPT | Performed by: FAMILY MEDICINE

## 2023-12-15 ENCOUNTER — HOSPITAL ENCOUNTER (OUTPATIENT)
Dept: MAMMOGRAPHY | Age: 48
Discharge: HOME | End: 2023-12-15
Attending: FAMILY MEDICINE
Payer: COMMERCIAL

## 2023-12-15 DIAGNOSIS — R92.2 INCONCLUSIVE MAMMOGRAM: ICD-10-CM

## 2023-12-15 PROCEDURE — 77061 BREAST TOMOSYNTHESIS UNI: CPT | Performed by: FAMILY MEDICINE

## 2023-12-15 PROCEDURE — 77065 DX MAMMO INCL CAD UNI: CPT | Performed by: FAMILY MEDICINE

## 2024-01-26 ENCOUNTER — OFFICE VISIT (OUTPATIENT)
Dept: FAMILY MEDICINE CLINIC | Facility: CLINIC | Age: 49
End: 2024-01-26
Payer: COMMERCIAL

## 2024-01-26 VITALS
HEART RATE: 70 BPM | OXYGEN SATURATION: 98 % | RESPIRATION RATE: 18 BRPM | BODY MASS INDEX: 25.95 KG/M2 | HEIGHT: 62 IN | WEIGHT: 141 LBS | TEMPERATURE: 98 F | DIASTOLIC BLOOD PRESSURE: 80 MMHG | SYSTOLIC BLOOD PRESSURE: 120 MMHG

## 2024-01-26 DIAGNOSIS — Z12.11 SCREENING FOR COLON CANCER: ICD-10-CM

## 2024-01-26 DIAGNOSIS — N95.1 PERIMENOPAUSE: ICD-10-CM

## 2024-01-26 DIAGNOSIS — Z00.00 ROUTINE GENERAL MEDICAL EXAMINATION AT A HEALTH CARE FACILITY: Primary | ICD-10-CM

## 2024-01-26 PROCEDURE — 3008F BODY MASS INDEX DOCD: CPT | Performed by: FAMILY MEDICINE

## 2024-01-26 PROCEDURE — 3074F SYST BP LT 130 MM HG: CPT | Performed by: FAMILY MEDICINE

## 2024-01-26 PROCEDURE — 99396 PREV VISIT EST AGE 40-64: CPT | Performed by: FAMILY MEDICINE

## 2024-01-26 PROCEDURE — 3079F DIAST BP 80-89 MM HG: CPT | Performed by: FAMILY MEDICINE

## 2024-01-26 NOTE — PROGRESS NOTES
Joanna Conway is a 49 year old female.  Chief Complaint   Patient presents with    Menopause     HPI:   Joanna Conway is a 49 year old female with history of anxiety, allergic rhinitis seen for her annual physical.  Pap done last year was normal, periods are irregular, occasionally heavy.  Does do breast self exam, mammogram done in December was normal. family history of breast cancer in maternal grandmother.  No constipation or blood in stool, no family history of colon cancer.    Healthy diet and exercises regularly.    Follows up with derm annually    Follows up with ophtho regularly for glaucoma in the left eye, stable.    Patient states periods are irregular, hot flashes, brain fog and is emotional, will have occasional hip pain and some vaginal dryness.    PAST MEDICAL HISTORY:     Past Medical History:   Diagnosis Date    Adjustment disorder with anxiety     Allergic rhinitis     Allergic rhinitis due to other allergen     Cancer (HCC)     basal cell, mole on back    Generalized anxiety disorder     History of blood clots     left eye on OCP    Nevus, non-neoplastic     Papanicolaou smear of cervix with atypical squamous cells of undetermined significance (ASC-US)     Raynaud's syndrome     Umbilical hernia without mention of obstruction or gangrene      PAST SURGICAL HISTORY:     Past Surgical History:   Procedure Laterality Date    HEMORRHOIDECTOMY      HERNIA SURGERY      HERNANDO BIOPSY STEREO NODULE 1 SITE LEFT (CPT=19081)  2017    benign    HERNANDO BIOPSY STEREO NODULE 2 SITE BILAT (CPT=19081/14907)  2017    BENIGN           ALLERGY:     Allergies   Allergen Reactions    Sulfa Drugs Cross Reactors      \"Sulfa\"    Sulfa Antibiotics RASH     MEDICATIONS:     Current Outpatient Medications   Medication Sig Dispense Refill    Dorzolamide HCl 2 % Ophthalmic Solution Place 0.5 drops into the left eye 2 (two) times daily.      Timolol Maleate 0.5 % Ophthalmic Solution Place 0.5 drops into the left  eye 2 (two) times daily.      TRAVATAN Z 0.004 % Ophthalmic Solution        FAMILY HISTORY:     Family History   Problem Relation Age of Onset    Heart Disease Paternal Grandfather         CAD    Cancer Paternal Grandmother         lung ca    Cancer Father         prostate ca    Heart Disease Maternal Grandfather         CAD    Lipids Maternal Grandmother         hyperlipidemia    Heart Disease Maternal Grandmother         CAD    Breast Cancer Maternal Grandmother 80    Cancer Maternal Grandmother     Cancer Paternal Uncle         colon ca, lung ca    High Cholesterol Other         mother's family       SOCIAL HISTORY:     Social History     Socioeconomic History    Marital status:     Number of children: 2   Occupational History    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   HENT:  Negative for congestion, ear pain, hearing loss and sore throat.    Eyes:  Negative for discharge, redness and visual disturbance.   Respiratory:  Negative for cough, chest tightness and shortness of breath.    Cardiovascular:  Negative for chest pain and palpitations.   Gastrointestinal:  Negative for abdominal pain, blood in stool, constipation and nausea.   Endocrine: Negative for cold intolerance, heat intolerance and polyuria.   Genitourinary:  Positive for menstrual problem. Negative for difficulty urinating, dysuria, frequency and urgency.   Musculoskeletal:  Negative for arthralgias, gait problem, joint swelling and myalgias.   Skin:   Negative for rash.   Allergic/Immunologic: Negative for food allergies.   Neurological:  Negative for dizziness, weakness, numbness and headaches.   Hematological:  Negative for adenopathy. Does not bruise/bleed easily.   Psychiatric/Behavioral:  Negative for dysphoric mood and sleep disturbance. The patient is not nervous/anxious.         PHYSICAL EXAM:   /80   Pulse 70   Temp 98 °F (36.7 °C) (Temporal)   Resp 18   Ht 5' 2\" (1.575 m)   Wt 141 lb (64 kg)   LMP 12/14/2023 (Exact Date)   SpO2 98%   BMI 25.79 kg/m²     Physical Exam  Constitutional:       General: She is not in acute distress.     Appearance: Normal appearance. She is well-developed and normal weight.   HENT:      Head: Normocephalic and atraumatic.      Right Ear: Tympanic membrane, ear canal and external ear normal.      Left Ear: Tympanic membrane, ear canal and external ear normal.      Nose: Nose normal.      Mouth/Throat:      Mouth: Mucous membranes are moist.      Pharynx: Oropharynx is clear.   Eyes:      Extraocular Movements: Extraocular movements intact.      Conjunctiva/sclera: Conjunctivae normal.      Pupils: Pupils are equal, round, and reactive to light.   Neck:      Thyroid: No thyromegaly.   Cardiovascular:      Rate and Rhythm: Normal rate and regular rhythm.      Heart sounds: Normal heart sounds. No murmur heard.  Pulmonary:      Effort: Pulmonary effort is normal. No respiratory distress.      Breath sounds: Normal breath sounds.   Chest:      Chest wall: No tenderness.   Breasts:     Right: Normal. No swelling (texture dense), inverted nipple, mass, nipple discharge, skin change or tenderness.      Left: Normal. No swelling (testure dense), inverted nipple, mass, nipple discharge, skin change or tenderness.   Abdominal:      General: Bowel sounds are normal. There is no distension.      Palpations: Abdomen is soft. There is no hepatomegaly, splenomegaly or mass.      Tenderness: There is no abdominal tenderness.       Hernia: No hernia is present.   Musculoskeletal:         General: Normal range of motion.      Cervical back: Normal range of motion and neck supple.      Right lower leg: No edema.      Left lower leg: No edema.   Lymphadenopathy:      Cervical: No cervical adenopathy.      Upper Body:      Right upper body: No supraclavicular, axillary or pectoral adenopathy.      Left upper body: No supraclavicular, axillary or pectoral adenopathy.   Skin:     General: Skin is warm.      Findings: No rash.   Neurological:      General: No focal deficit present.      Mental Status: She is alert and oriented to person, place, and time.      Cranial Nerves: No cranial nerve deficit.      Sensory: No sensory deficit.      Motor: No weakness.      Coordination: Coordination normal.      Gait: Gait normal.      Deep Tendon Reflexes: Reflexes are normal and symmetric. Reflexes normal.   Psychiatric:         Mood and Affect: Mood normal.         Behavior: Behavior normal.         Thought Content: Thought content normal.         Judgment: Judgment normal.       ASSESSMENT AND PLAN:   Joanna was seen today for menopause.    Diagnoses and all orders for this visit:    Routine general medical examination at a health care facility  -     Assay, Thyroid Stim Hormone  -     Lipid Panel  -     Comp Metabolic Panel (14)  -     CBC With Differential With Platelet    Screening for colon cancer  -     Gastro Referral - In Network    Perimenopause        - discussed symptoms, advised due to her history of clot not a candidate for HRT       - continue to monitor symptoms, this is normal as the hormones are changing.       - encouraged regular exercise and healthy diet.    Age appropriate anticipatory guidance reviewed.  Health Maintenance   Topic Date Due    Colorectal Cancer Screening  Never done    COVID-19 Vaccine (5 - 2023-24 season) 09/01/2023    Annual Physical  Done today    Influenza Vaccine (1) 06/30/2024 (Originally 10/1/2023)    Mammogram   12/15/2024    Pap Smear  01/10/2026    DTaP,Tdap,and Td Vaccines (3 - Td or Tdap) 11/16/2028    Annual Depression Screening  Completed    Pneumococcal Vaccine: Birth to 64yrs  Aged Out          The 21st Century Cures Act makes medical notes like these available to patients in the interest of transparency. Please be advised this is a medical document. Medical documents are intended to carry relevant information, facts as evident, and the clinical opinion of the practitioner. The medical note is intended as peer to peer communication and may appear blunt or direct. It is written in medical language and may contain abbreviations or verbiage that are unfamiliar.

## 2024-02-03 LAB
ABSOLUTE BASOPHILS: 41 CELLS/UL (ref 0–200)
ABSOLUTE EOSINOPHILS: 101 CELLS/UL (ref 15–500)
ABSOLUTE LYMPHOCYTES: 1780 CELLS/UL (ref 850–3900)
ABSOLUTE MONOCYTES: 290 CELLS/UL (ref 200–950)
ABSOLUTE NEUTROPHILS: 2387 CELLS/UL (ref 1500–7800)
ALBUMIN/GLOBULIN RATIO: 1.9 (CALC) (ref 1–2.5)
ALBUMIN: 4.3 G/DL (ref 3.6–5.1)
ALKALINE PHOSPHATASE: 70 U/L (ref 31–125)
ALT: 15 U/L (ref 6–29)
AST: 24 U/L (ref 10–35)
BASOPHILS: 0.9 %
BILIRUBIN, TOTAL: 0.4 MG/DL (ref 0.2–1.2)
BUN: 15 MG/DL (ref 7–25)
CALCIUM: 9.2 MG/DL (ref 8.6–10.2)
CARBON DIOXIDE: 27 MMOL/L (ref 20–32)
CHLORIDE: 105 MMOL/L (ref 98–110)
CHOL/HDLC RATIO: 1.8 (CALC)
CHOLESTEROL, TOTAL: 155 MG/DL
CREATININE: 0.81 MG/DL (ref 0.5–0.99)
EGFR: 90 ML/MIN/1.73M2
EOSINOPHILS: 2.2 %
GLOBULIN: 2.3 G/DL (CALC) (ref 1.9–3.7)
GLUCOSE: 85 MG/DL (ref 65–99)
HDL CHOLESTEROL: 84 MG/DL
HEMATOCRIT: 39.1 % (ref 35–45)
HEMOGLOBIN: 13.3 G/DL (ref 11.7–15.5)
LDL-CHOLESTEROL: 57 MG/DL (CALC)
LYMPHOCYTES: 38.7 %
MCH: 31.8 PG (ref 27–33)
MCHC: 34 G/DL (ref 32–36)
MCV: 93.5 FL (ref 80–100)
MONOCYTES: 6.3 %
MPV: 11.6 FL (ref 7.5–12.5)
NEUTROPHILS: 51.9 %
NON-HDL CHOLESTEROL: 71 MG/DL (CALC)
PLATELET COUNT: 217 THOUSAND/UL (ref 140–400)
POTASSIUM: 3.8 MMOL/L (ref 3.5–5.3)
PROTEIN, TOTAL: 6.6 G/DL (ref 6.1–8.1)
RDW: 12.6 % (ref 11–15)
RED BLOOD CELL COUNT: 4.18 MILLION/UL (ref 3.8–5.1)
SODIUM: 139 MMOL/L (ref 135–146)
TRIGLYCERIDES: 48 MG/DL
TSH: 1.31 MIU/L
WHITE BLOOD CELL COUNT: 4.6 THOUSAND/UL (ref 3.8–10.8)

## 2024-05-28 ENCOUNTER — OFFICE VISIT (OUTPATIENT)
Dept: FAMILY MEDICINE CLINIC | Facility: CLINIC | Age: 49
End: 2024-05-28

## 2024-05-28 VITALS
TEMPERATURE: 98 F | BODY MASS INDEX: 24.8 KG/M2 | WEIGHT: 140 LBS | HEART RATE: 67 BPM | DIASTOLIC BLOOD PRESSURE: 68 MMHG | OXYGEN SATURATION: 98 % | RESPIRATION RATE: 16 BRPM | SYSTOLIC BLOOD PRESSURE: 113 MMHG | HEIGHT: 63 IN

## 2024-05-28 DIAGNOSIS — J01.00 ACUTE MAXILLARY SINUSITIS, RECURRENCE NOT SPECIFIED: Primary | ICD-10-CM

## 2024-05-28 PROCEDURE — 99213 OFFICE O/P EST LOW 20 MIN: CPT | Performed by: NURSE PRACTITIONER

## 2024-05-28 RX ORDER — AMOXICILLIN AND CLAVULANATE POTASSIUM 875; 125 MG/1; MG/1
1 TABLET, FILM COATED ORAL 2 TIMES DAILY
Qty: 14 TABLET | Refills: 0 | Status: SHIPPED | OUTPATIENT
Start: 2024-05-28 | End: 2024-06-04

## 2024-05-28 NOTE — PROGRESS NOTES
CHIEF COMPLAINT:     Chief Complaint   Patient presents with    Sinus Problem     Runny nose started 2 weeks ago, ear pain x 1 day, OTC dayquil, flonase,        HPI:   Joanna Conway is a 49 year old female who presents for sinus congestion for  2  weeks. Symptoms have been worsening since onset. Sinus congestion/pain is described as a pressure and is located mainly across cheeks.  Reports thick nasal discharge.  dayquil makes symptoms better. Has treated symptoms with Flonase and dayquil.  Patient also reports headache, cough, fullness in ears, sore throat only at the beginning of sx's, ear pain, runny nose, cough that keeps her awake at night  Denies fever, dental pain, tinnitus, N/V, confusion, neck pain, visual disturbance.       Current Outpatient Medications   Medication Sig Dispense Refill    amoxicillin clavulanate 875-125 MG Oral Tab Take 1 tablet by mouth 2 (two) times daily for 7 days. 14 tablet 0    Dorzolamide HCl 2 % Ophthalmic Solution Place 0.5 drops into the left eye 2 (two) times daily.      Timolol Maleate 0.5 % Ophthalmic Solution Place 0.5 drops into the left eye 2 (two) times daily.      TRAVATAN Z 0.004 % Ophthalmic Solution         Past Medical History:    Adjustment disorder with anxiety    Allergic rhinitis    Allergic rhinitis due to other allergen    Cancer (HCC)    basal cell, mole on back    Generalized anxiety disorder    History of blood clots    left eye on OCP    Nevus, non-neoplastic    Papanicolaou smear of cervix with atypical squamous cells of undetermined significance (ASC-US)    Raynaud's syndrome    Umbilical hernia without mention of obstruction or gangrene      Past Surgical History:   Procedure Laterality Date    Hemorrhoidectomy      Hernia surgery      Katie biopsy stereo nodule 1 site left (cpt=19081)  2017    benign    Katie biopsy stereo nodule 2 site bilat (cpt=19081/18852)  2017    BENIGN            Family History   Problem Relation Age of Onset    Heart  Disease Paternal Grandfather         CAD    Cancer Paternal Grandmother         lung ca    Cancer Father         prostate ca    Heart Disease Maternal Grandfather         CAD    Lipids Maternal Grandmother         hyperlipidemia    Heart Disease Maternal Grandmother         CAD    Breast Cancer Maternal Grandmother 80    Cancer Maternal Grandmother     Cancer Paternal Uncle         colon ca, lung ca    High Cholesterol Other         mother's family      Social History     Socioeconomic History    Marital status:     Number of children: 2   Occupational History    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    LUNGS: denies shortness of breath or wheezing, See HPI  CARDIOVASCULAR: denies chest pain or palpitations   GI: denies N/V/C or abdominal pain  NEURO:  No numbness or tingling in face.    EXAM:   /68   Pulse 67   Temp 98.4 °F (36.9 °C)   Resp 16   Ht 5' 3\" (1.6 m)   Wt 140 lb (63.5 kg)   LMP 05/13/2024 (Approximate)   SpO2 98%   BMI 24.80 kg/m²   GENERAL: well developed, well nourished,in no apparent distress  SKIN: no rashes,no suspicious lesions  HEAD: atraumatic, normocephalic, +  tenderness on palpation of maxillary sinuses  EYES: conjunctiva clear, EOM intact  EARS: TM's cloudy, no bulging, no retraction, + fluid, bony landmarks obscured  NOSE: nostrils patent, yellow nasal mucous, nasal mucosa reddened.  THROAT: oral mucosa pink, moist. No visible dental caries. Posterior  pharynx is  erythematous. without exudates, visible post nasal drainage.   NECK: supple, non-tender  LUNGS: clear to auscultation bilaterally, no wheezes or rhonchi. Breathing is non labored.  CARDIO: RRR without murmur  EXTREMITIES: no cyanosis, clubbing or edema  LYMPH:  No lymphadenopathy.        ASSESSMENT AND PLAN:   ASSESSMENT:  Joanna Conway is a 49 year old female who presents with 2 week hx of URI that is now presenting with sinus symptoms.     ASSESSMENT:   Joanna was seen today for sinus problem.    Diagnoses and all orders for this visit:    Acute maxillary sinusitis, recurrence not specified    Other orders  -     amoxicillin clavulanate 875-125 MG Oral Tab; Take 1 tablet by mouth 2 (two) times daily for 7 days.          PLAN:   Take Augmentin as prescribed.  Drink plenty of water and rest.  A cool mist humidifier in bedroom at night may be helpful. Breath in steam from a steamy shower.  Saline spray on each nare and gently blow your nose.   Meds as below.    Comfort care instructions as listed in Patient Instructions    Meds & Refills for this Visit:  Requested Prescriptions     Signed Prescriptions Disp Refills    amoxicillin clavulanate 875-125 MG Oral Tab 14 tablet 0     Sig: Take 1 tablet by mouth 2 (two) times daily for 7 days.       Risks, benefits, side effects of medication addressed and explained.    Return to clinic or follow up with PCP for further evaluation if symptoms are not improved within 48-72 hours  Go to ER if facial or periorbital swelling (swelling around the eye) develops, if you become confused, have a high fever lasting more than 24-48 hours, develop neck pain, or have visual disturbances.   Humidify the air  Increase fluid intake  Steam inhalation and warm compresses often help relieve pressure  Sleep with head of bed elevated  Avoid allergens and excessively dry heat  Avoid swimming/driving and air travel during acute period  Avoid use of antihistamines unless disease was  caused by allergies  Normal saline nasal spray over the counter to loosen nasal secretions  Finish all antibiotics as ordered    The patient indicates understanding of these issues and agrees to the plan.  The patient is asked to return if sx's persist or worsen.

## 2024-08-18 ENCOUNTER — PATIENT MESSAGE (OUTPATIENT)
Dept: FAMILY MEDICINE CLINIC | Facility: CLINIC | Age: 49
End: 2024-08-18

## 2024-08-18 DIAGNOSIS — Z12.31 ENCOUNTER FOR SCREENING MAMMOGRAM FOR MALIGNANT NEOPLASM OF BREAST: Primary | ICD-10-CM

## 2024-12-09 ENCOUNTER — HOSPITAL ENCOUNTER (OUTPATIENT)
Dept: MAMMOGRAPHY | Age: 49
Discharge: HOME | End: 2024-12-09
Attending: FAMILY MEDICINE
Payer: COMMERCIAL

## 2024-12-09 DIAGNOSIS — Z12.31 ENCOUNTER FOR SCREENING MAMMOGRAM FOR MALIGNANT NEOPLASM OF BREAST: ICD-10-CM

## 2024-12-09 PROCEDURE — 77063 BREAST TOMOSYNTHESIS BI: CPT | Performed by: FAMILY MEDICINE

## 2024-12-09 PROCEDURE — 77067 SCR MAMMO BI INCL CAD: CPT | Performed by: FAMILY MEDICINE

## 2025-02-12 ENCOUNTER — OFFICE VISIT (OUTPATIENT)
Dept: FAMILY MEDICINE CLINIC | Facility: CLINIC | Age: 50
End: 2025-02-12
Payer: COMMERCIAL

## 2025-02-12 VITALS
RESPIRATION RATE: 18 BRPM | BODY MASS INDEX: 25.69 KG/M2 | SYSTOLIC BLOOD PRESSURE: 98 MMHG | HEIGHT: 63 IN | OXYGEN SATURATION: 98 % | DIASTOLIC BLOOD PRESSURE: 62 MMHG | TEMPERATURE: 98 F | HEART RATE: 71 BPM | WEIGHT: 145 LBS

## 2025-02-12 DIAGNOSIS — Z00.00 ROUTINE GENERAL MEDICAL EXAMINATION AT A HEALTH CARE FACILITY: Primary | ICD-10-CM

## 2025-02-12 DIAGNOSIS — Z12.11 SCREENING FOR COLON CANCER: ICD-10-CM

## 2025-02-12 DIAGNOSIS — N95.1 PERIMENOPAUSAL: ICD-10-CM

## 2025-02-12 PROCEDURE — 99396 PREV VISIT EST AGE 40-64: CPT | Performed by: FAMILY MEDICINE

## 2025-02-12 NOTE — PROGRESS NOTES
Family Medicine Progress Note    Joanna Conway is a 50 year old female.  ASSESSMENT AND PLAN:  Joanna was seen today for physical.    Diagnoses and all orders for this visit:    Routine general medical examination at a health care facility  -     CBC With Differential With Platelet  -     Comp Metabolic Panel (14)  -     Lipid Panel  -     Assay, Thyroid Stim Hormone    Screening for colon cancer  -     Occult Blood, Fecal, FIT Immunoassay ([employer] [facility])    Perimenopausal    Age appropriate anticipatory guidance reviewed  Health Maintenance   Topic Date Due    Colorectal Cancer Screening  Never done    COVID-19 Vaccine (5 - 2024-25 season) 09/01/2024    Annual Physical  01/26/2025    Influenza Vaccine (1) 06/30/2025 (Originally 10/1/2024)    Mammogram  12/09/2025    Pap Smear  01/10/2026    DTaP,Tdap,and Td Vaccines (3 - Td or Tdap) 11/16/2028    Annual Depression Screening  Completed    Pneumococcal Vaccine: Birth to 50yrs  Aged Out    Meningococcal B Vaccine  Aged Out       The patient indicates understanding of these issues and agrees to the plan.  Follow-Up: The patient is asked to return in Return in about 1 year (around 2/12/2026) for annual.  .     Diana Dumont MD   02/12/25      Chief Complaint   Patient presents with    Physical     HPI:   Joanna Conway is a 50 year old female with no significant past medical history seen for her annual physical.    Pap done in 2023 was normal, patient states her menstrual cycle is irregular and very light, might have just very light spotting for a couple of days.  Does do breast self-exam, mammogram done in December was normal,  family history of breast ca in maternal grand mother.  Denies constipation or blood in stool,  family history of colon Ca in paternal uncle, FIT done last year was normal.    Patient states eats healthy, did see a nutritionist and has changed her diet, exercises regularly, does cardio and strength  training.    PAST MEDICAL HISTORY:     Past Medical History:    Adjustment disorder with anxiety    Allergic rhinitis    Allergic rhinitis due to other allergen    Cancer (HCC)    basal cell, mole on back    Generalized anxiety disorder    History of blood clots    left eye on OCP    Nevus, non-neoplastic    Papanicolaou smear of cervix with atypical squamous cells of undetermined significance (ASC-US)    Raynaud's syndrome    Umbilical hernia without mention of obstruction or gangrene     PAST SURGICAL HISTORY:     Past Surgical History:   Procedure Laterality Date    Hemorrhoidectomy      Hernia surgery      Katie biopsy stereo nodule 1 site left (cpt=19081) Left 2017    benign    Katie biopsy stereo nodule 2 site bilat (cpt=19081/45425)  2017    BENIGN           ALLERGY:     Allergies as of 2025 - Review Complete 2025   Allergen Reaction Noted    Sulfa drugs cross reactors  2013    Sulfa antibiotics RASH 09/15/2017     MEDICATIONS:     Current Outpatient Medications   Medication Sig Dispense Refill    Dorzolamide HCl 2 % Ophthalmic Solution Place 0.5 drops into the left eye 2 (two) times daily.      Timolol Maleate 0.5 % Ophthalmic Solution Place 0.5 drops into the left eye 2 (two) times daily.      TRAVATAN Z 0.004 % Ophthalmic Solution        FAMILY HISTORY:     Family History   Problem Relation Age of Onset    Cancer Father         prostate ca    Lipids Maternal Grandmother         hyperlipidemia    Heart Disease Maternal Grandmother         CAD    Breast Cancer Maternal Grandmother 80    Cancer Maternal Grandmother     Heart Disease Maternal Grandfather         CAD    Cancer Paternal Grandmother         lung ca    Heart Disease Paternal Grandfather         CAD    Cancer Paternal Uncle         colon ca, lung ca    High Cholesterol Other         mother's family    Stroke Maternal Aunt        SOCIAL HISTORY:     Social History     Socioeconomic History    Marital status:     Number  of children: 2   Occupational History    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   Tobacco Use    Smoking status: Never    Smokeless tobacco: Never   Vaping Use    Vaping status: Never Used   Substance and Sexual Activity    Alcohol use: Yes     Comment: rare    Drug use: No    Sexual activity: Yes     Partners: Male   Other Topics Concern    Caffeine Concern No    Stress Concern No    Weight Concern No    Special Diet No    Exercise Yes    Seat Belt Yes     Social Drivers of Health     Food Insecurity: No Food Insecurity (2/12/2025)    NCSS - Food Insecurity     Worried About Running Out of Food in the Last Year: No     Ran Out of Food in the Last Year: No   Transportation Needs: No Transportation Needs (2/12/2025)    NCSS - Transportation     Lack of Transportation: No   Housing Stability: Not At Risk (2/12/2025)    NCSS - Housing/Utilities     Has Housing: Yes     Worried About Losing Housing: No     Unable to Get Utilities: No     REVIEW OF SYSTEMS:   Review of Systems   Constitutional:  Negative for appetite change, fatigue, fever and unexpected weight change.   HENT:  Negative for congestion, ear pain, hearing loss and sore throat.    Eyes:  Negative for discharge, redness and visual disturbance.   Respiratory:  Negative for cough, chest tightness and shortness of breath.    Cardiovascular:  Negative for chest pain and palpitations.   Gastrointestinal:  Negative for abdominal pain, blood in stool, constipation and nausea.   Endocrine: Negative for cold intolerance, heat intolerance and polyuria.   Genitourinary:  Negative for difficulty urinating, dysuria, frequency and urgency.   Musculoskeletal:  Negative for arthralgias, gait problem, joint swelling and myalgias.   Skin:  Negative for rash.   Allergic/Immunologic: Negative for food allergies.   Neurological:  Negative for dizziness, weakness, numbness and headaches.   Hematological:  Negative for adenopathy. Does not bruise/bleed  easily.   Psychiatric/Behavioral:  Negative for dysphoric mood and sleep disturbance. The patient is not nervous/anxious.         PHYSICAL EXAM:   BP 98/62   Pulse 71   Temp 98.2 °F (36.8 °C) (Temporal)   Resp 18   Ht 5' 3\" (1.6 m)   Wt 145 lb (65.8 kg)   LMP 12/23/2024 (Exact Date)   SpO2 98%   BMI 25.69 kg/m²     Physical Exam  Constitutional:       General: She is not in acute distress.     Appearance: Normal appearance. She is well-developed and normal weight.   HENT:      Head: Normocephalic and atraumatic.      Right Ear: Tympanic membrane, ear canal and external ear normal.      Left Ear: Tympanic membrane, ear canal and external ear normal.      Nose: Nose normal.      Mouth/Throat:      Mouth: Mucous membranes are moist.      Pharynx: Oropharynx is clear.   Eyes:      Extraocular Movements: Extraocular movements intact.      Conjunctiva/sclera: Conjunctivae normal.      Pupils: Pupils are equal, round, and reactive to light.   Neck:      Thyroid: No thyromegaly.   Cardiovascular:      Rate and Rhythm: Normal rate and regular rhythm.      Pulses: Normal pulses.      Heart sounds: Normal heart sounds. No murmur heard.  Pulmonary:      Effort: Pulmonary effort is normal. No respiratory distress.      Breath sounds: Normal breath sounds.   Chest:      Chest wall: No tenderness.   Abdominal:      General: Bowel sounds are normal. There is no distension.      Palpations: Abdomen is soft. There is no hepatomegaly, splenomegaly or mass.      Tenderness: There is no abdominal tenderness.      Hernia: No hernia is present.   Musculoskeletal:         General: Normal range of motion.      Cervical back: Normal range of motion and neck supple.      Right lower leg: No edema.      Left lower leg: No edema.   Lymphadenopathy:      Cervical: No cervical adenopathy.   Skin:     General: Skin is warm.      Findings: No rash.   Neurological:      General: No focal deficit present.      Mental Status: She is alert and  oriented to person, place, and time.      Cranial Nerves: No cranial nerve deficit.      Sensory: No sensory deficit.      Motor: No weakness.      Coordination: Coordination normal.      Gait: Gait normal.      Deep Tendon Reflexes: Reflexes are normal and symmetric. Reflexes normal.   Psychiatric:         Mood and Affect: Mood normal.         Behavior: Behavior normal.         Thought Content: Thought content normal.         Judgment: Judgment normal.          The 21st Century Cures Act makes medical notes like these available to patients in the interest of transparency. Please be advised this is a medical document. Medical documents are intended to carry relevant information, facts as evident, and the clinical opinion of the practitioner. The medical note is intended as peer to peer communication and may appear blunt or direct. It is written in medical language and may contain abbreviations or verbiage that are unfamiliar.     Diana Dumont MD

## 2025-02-22 LAB
ABSOLUTE BASOPHILS: 31 CELLS/UL (ref 0–200)
ABSOLUTE EOSINOPHILS: 99 CELLS/UL (ref 15–500)
ABSOLUTE LYMPHOCYTES: 1815 CELLS/UL (ref 850–3900)
ABSOLUTE MONOCYTES: 411 CELLS/UL (ref 200–950)
ABSOLUTE NEUTROPHILS: 2844 CELLS/UL (ref 1500–7800)
ALBUMIN/GLOBULIN RATIO: 1.6 (CALC) (ref 1–2.5)
ALBUMIN: 4.1 G/DL (ref 3.6–5.1)
ALKALINE PHOSPHATASE: 95 U/L (ref 31–125)
ALT: 20 U/L (ref 6–29)
AST: 26 U/L (ref 10–35)
BASOPHILS: 0.6 %
BILIRUBIN, TOTAL: 0.2 MG/DL (ref 0.2–1.2)
BUN: 19 MG/DL (ref 7–25)
CALCIUM: 9.4 MG/DL (ref 8.6–10.2)
CARBON DIOXIDE: 28 MMOL/L (ref 20–32)
CHLORIDE: 106 MMOL/L (ref 98–110)
CHOL/HDLC RATIO: 1.7 (CALC)
CHOLESTEROL, TOTAL: 150 MG/DL
CREATININE: 0.77 MG/DL (ref 0.5–0.99)
EGFR: 95 ML/MIN/1.73M2
EOSINOPHILS: 1.9 %
GLOBULIN: 2.6 G/DL (CALC) (ref 1.9–3.7)
GLUCOSE: 82 MG/DL (ref 65–99)
HDL CHOLESTEROL: 87 MG/DL
HEMATOCRIT: 40.8 % (ref 35–45)
HEMOGLOBIN: 13.1 G/DL (ref 11.7–15.5)
LDL-CHOLESTEROL: 54 MG/DL (CALC)
LYMPHOCYTES: 34.9 %
MCH: 30.2 PG (ref 27–33)
MCHC: 32.1 G/DL (ref 32–36)
MCV: 94 FL (ref 80–100)
MONOCYTES: 7.9 %
MPV: 11.4 FL (ref 7.5–12.5)
NEUTROPHILS: 54.7 %
NON-HDL CHOLESTEROL: 63 MG/DL (CALC)
PLATELET COUNT: 230 THOUSAND/UL (ref 140–400)
POTASSIUM: 4 MMOL/L (ref 3.5–5.3)
PROTEIN, TOTAL: 6.7 G/DL (ref 6.1–8.1)
RDW: 12.7 % (ref 11–15)
RED BLOOD CELL COUNT: 4.34 MILLION/UL (ref 3.8–5.1)
SODIUM: 140 MMOL/L (ref 135–146)
TRIGLYCERIDES: 31 MG/DL
TSH: 1.62 MIU/L
WHITE BLOOD CELL COUNT: 5.2 THOUSAND/UL (ref 3.8–10.8)

## 2025-03-21 ENCOUNTER — PATIENT MESSAGE (OUTPATIENT)
Dept: FAMILY MEDICINE CLINIC | Facility: CLINIC | Age: 50
End: 2025-03-21

## 2025-08-28 ENCOUNTER — PATIENT MESSAGE (OUTPATIENT)
Dept: FAMILY MEDICINE CLINIC | Facility: CLINIC | Age: 50
End: 2025-08-28

## (undated) NOTE — LETTER
07/12/21        Yennifer      Dear Clifford Augustine,    [id number] [facility] records indicate that you have outstanding lab work and or testing that was ordered for you and has not yet been completed:  Orders Placed This Encounter

## (undated) NOTE — MR AVS SNAPSHOT
After Visit Summary   12/9/2019    Anjelica Rincon    MRN: [id number]           Visit Information     Date & Time  12/9/2019  9:00 AM Provider  Keyla Gutiérrez MD [address] [facility]erral[facility].  Phone  [phone] conditions such as allergies, colds, cough, fever, rash, sore throat, headache and pink eye. The cost for a Video Visit is currently $35.         If you receive a survey from [employer], please take a few minutes to complete it and provide feedback.  We s For more information about hours, locations or appointment options available at [facility],  visit: [email or web address] or call [phone]. ([phone])